# Patient Record
Sex: FEMALE | Race: OTHER
[De-identification: names, ages, dates, MRNs, and addresses within clinical notes are randomized per-mention and may not be internally consistent; named-entity substitution may affect disease eponyms.]

---

## 2022-01-08 ENCOUNTER — HOSPITAL ENCOUNTER (OUTPATIENT)
Dept: HOSPITAL 63 - RAD | Age: 37
End: 2022-01-08
Payer: OTHER GOVERNMENT

## 2022-01-08 DIAGNOSIS — M25.572: Primary | ICD-10-CM

## 2022-01-08 PROCEDURE — 73610 X-RAY EXAM OF ANKLE: CPT

## 2022-01-09 NOTE — RAD
XR EXAM OF ANKLE_LEFT 3V



History: Reason: LEFT ANKLE PAIN, GANGLIAL CYST NEAR LATERAL MALLEOLOUS / Spl. Instructions:  / Histo
ry: 



Technique: 3 views left ankle



Comparison: None.



Findings:

No dislocation. No acute fracture. Symmetric ankle mortise.



Impression: 

1.  No acute osseous abnormality.



Electronically signed by: Erickson Flores DO (1/9/2022 2:52 AM) [facility]

## 2022-01-13 ENCOUNTER — HOSPITAL ENCOUNTER (EMERGENCY)
Dept: HOSPITAL 63 - ER | Age: 37
Discharge: HOME | End: 2022-01-13
Payer: OTHER GOVERNMENT

## 2022-01-13 VITALS — WEIGHT: 176.37 LBS | HEIGHT: 62 IN | BODY MASS INDEX: 32.46 KG/M2

## 2022-01-13 VITALS — SYSTOLIC BLOOD PRESSURE: 120 MMHG | DIASTOLIC BLOOD PRESSURE: 56 MMHG
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
## 2022-01-13 DIAGNOSIS — Z88.2: ICD-10-CM

## 2022-01-13 DIAGNOSIS — L50.9: ICD-10-CM

## 2022-01-13 DIAGNOSIS — T78.40XA: Primary | ICD-10-CM

## 2022-01-13 PROCEDURE — 99283 EMERGENCY DEPT VISIT LOW MDM: CPT

## 2022-01-13 PROCEDURE — 94640 AIRWAY INHALATION TREATMENT: CPT

## 2022-01-13 PROCEDURE — 96372 THER/PROPH/DIAG INJ SC/IM: CPT

## 2022-01-13 NOTE — PHYS DOC
General Adult


HPI:


HPI:


".. I am having another one of my allergic reactions... ".. " I don't know what 

is causing these episodes..."





Patient is a 36 year old female who presents with above hx and complaints of 

allergic reaction.   Patient is a several episodes where she developed hives.  

Currently she has hives and erythemic itchy areas all over her body.  The most 

prevalent on her back.  Patient did take a warm shower to try to relieve the 

itching however this only exacerbated her symptoms.  Patient denies any new 

foods.  Patient denies any new meds.  Patient denies any new soaps or personal 

hygiene products.  Patient denies any recent travel.  Notes no history 

immunosuppression.  Does have a confirmed allergy to sulfa and sulfa 

medications.  Previous skin allergy testing did not identify a possible source 

of her episodic hives.  Patient has been taking Zyrtec with minimal relief.  Pt.

normally follows with Dr. Tapan Mccullough





Review of Systems:


Review of Systems:


Constitutional:  Denies fever or chills 


Eyes:  Denies change in visual acuity 


HENT:  Denies nasal congestion or sore throat 


Respiratory:  Denies cough or shortness of breath 


Cardiovascular:  Denies chest pain or edema 


GI:  Denies abdominal pain, nausea, vomiting, bloody stools or diarrhea 


: Denies dysuria 


Musculoskeletal:  Denies back pain or joint pain 


Integument: Complains of rash/hives


Neurologic:  Denies headache, focal weakness or sensory changes 


Endocrine:  Denies polyuria or polydipsia 


Lymphatic:  Denies swollen glands 


Psychiatric:  Denies depression or anxiety





Family History:


Family History:


Noncontributory to presentation





Current Medications:


Current Meds:


See nursing for home meds





Allergies:


Allergies:


Sulfa allergy





Physical Exam:


PE:





Constitutional: In moderate acute distress, non-toxic appearance. []


HENT: Normocephalic, atraumatic, bilateral external ears normal, oropharynx 

moist, no oral exudates, nose slightly swollen turbinates with clear rhinorrhea


Eyes: PERRLA, EOMI, conjunctiva normal, no discharge. [] 


Neck: Normal range of motion, no tenderness, supple, no stridor. [] 


Cardiovascular:Heart rate regular rhythm, no murmur []


Lungs & Thorax:  Bilateral breath sounds equal at apex with few scattered 

wheezes on auscultation []


Abdomen: Bowel sounds normal, soft, no tenderness, no masses, no pulsatile 

masses.  Obese


Skin: Warm, dry, diffuse erythemic rash/hives


Back: No tenderness, no CVA tenderness. [] 


Extremities: No tenderness, no cyanosis, no clubbing, ROM intact, no edema. [] 


Neurologic: Alert and oriented X 3, normal motor function, normal sensory fun

ction, no focal deficits noted. []


Psychologic: Affect anxious,, judgement normal, mood normal. []





EKG:


EKG:


[]





Radiology/Procedures:


Radiology/Procedures:


[]





Heart Score:


C/O Chest Pain:  N/A


Risk Factors:


Risk Factors:  DM, Current or recent (<one month) smoker, HTN, HLP, family 

history of CAD, obesity.


Risk Scores:


Score 0 - 3:  2.5% MACE over next 6 weeks - Discharge Home


Score 4 - 6:  20.3% MACE over next 6 weeks - Admit for Clinical Observation


Score 7 - 10:  72.7% MACE over next 6 weeks - Early Invasive Strategies





Course & Med Decision Making:


Course & Med Decision Making


Pertinent Labs and Imaging studies reviewed. (See chart for details)





Patient to try and identify etiology or cause of her episodes of hives.  Advised

 this may be a combined response 1 or more items together because of the 

reaction whether alone they do not.  Follow-up primary care.  We will give Depo-

Medrol.40.   Pepcid, 20 twice daily and patient to use.  Patient to use 

Ventolin.  MDI 2 puffs 4 times a day.  Patient may also take Benadryl 25 to 50 

mg 4 times a day when home.





Impression:





1.  Allergic reaction-hives





[]





Dragon Disclaimer:


Dragon Disclaimer:


This electronic medical record was generated, in whole or in part, using a voice

 recognition dictation system.





Departure


Departure:


Referrals:  


GABO GONSALVES DO, MPH (PCP)


Scripts


Famotidine (PEPCID) 20 Mg Tablet


20 MG PO BID for hives for 30 Days, #60 TAB


   Prov: GUSTAVO VALENCIA MD         1/13/22





Dragon Disclaimer


This chart was dictated in whole or in part using Voice Recognition software in 

a busy, high-work load, and often noisy Emergency Department environment.  It 

may contain unintended and wholly unrecognized errors or omissions.











GUSTAVO VALENCIA MD           Jan 13, 2022 20:00

## 2022-03-16 ENCOUNTER — HOSPITAL ENCOUNTER (OUTPATIENT)
Dept: HOSPITAL 61 - SURG | Age: 37
Discharge: HOME | End: 2022-03-16
Attending: STUDENT IN AN ORGANIZED HEALTH CARE EDUCATION/TRAINING PROGRAM
Payer: OTHER GOVERNMENT

## 2022-03-16 VITALS — SYSTOLIC BLOOD PRESSURE: 111 MMHG | DIASTOLIC BLOOD PRESSURE: 56 MMHG

## 2022-03-16 VITALS — HEIGHT: 62 IN | WEIGHT: 167.99 LBS | BODY MASS INDEX: 30.91 KG/M2

## 2022-03-16 DIAGNOSIS — M67.472: Primary | ICD-10-CM

## 2022-03-16 DIAGNOSIS — Z88.2: ICD-10-CM

## 2022-03-16 DIAGNOSIS — Z85.828: ICD-10-CM

## 2022-03-16 DIAGNOSIS — Z79.899: ICD-10-CM

## 2022-03-16 DIAGNOSIS — Z88.8: ICD-10-CM

## 2022-03-16 DIAGNOSIS — Z98.890: ICD-10-CM

## 2022-03-16 PROCEDURE — A6223 GAUZE >16<=48 NO W/SAL W/O B: HCPCS

## 2022-03-16 PROCEDURE — 81025 URINE PREGNANCY TEST: CPT

## 2022-03-16 PROCEDURE — A6253 ABSORPT DRG > 48 SQ IN W/O B: HCPCS

## 2022-03-16 PROCEDURE — A6449 LT COMPRES BAND >=3" <5"/YD: HCPCS

## 2022-03-16 PROCEDURE — A4209 5+ CC STERILE SYRINGE&NEEDLE: HCPCS

## 2022-03-16 PROCEDURE — A6402 STERILE GAUZE <= 16 SQ IN: HCPCS

## 2022-03-16 PROCEDURE — 28039 EXC FOOT/TOE TUM SC 1.5 CM/>: CPT

## 2022-03-16 PROCEDURE — 88304 TISSUE EXAM BY PATHOLOGIST: CPT

## 2022-03-16 PROCEDURE — A4930 STERILE, GLOVES PER PAIR: HCPCS

## 2022-03-16 NOTE — PDOC4
OPERATIVE NOTE


Date:


Date:  Mar 16, 2022





Pre-Op Diagnosis:


Left foot soft tissue mass





Post-Op Diagnosis:


Same as above





Procedure Performed:


Left soft tissue mass excision and pathology study





Surgeon:


Rashel Stauffer DPM





Anesthesia Type:


General





Blood Loss:


5 cc





Specimans Obtained:


Soft tissue mass from the left sinus tarsi sent for pathology





Findings:


Soft tissue mass that is well encapsulated with clear viscous fluid filled in 

the center, Measures approximately 3 x 2 cm.  The soft tissue mass extended from

the sinus tarsi, not from the peroneal tendon sheath.  There is no significant 

subtalar joint synovitis, or OCD lesion.





Complications:


None





Operative Note:


Under mild sedation, patient was brought into the operating room and placed on 

operating table in a supine position.  A formal timeout confirming patient's 

identity, procedure, procedure site was carried out.  Following IV prophylactic 

antibiotics, general anesthesia, a well-padded High ankle tourniquet was placed 

on the left lower extremity.  The left lower extremity was then scrubbed, 

prepped and draped using aseptic techniques.  The left lower extremity was 

exsanguinated and then the tourniquet was inflated to 225 mill mercury.





The attention was directed to the dorsal lateral sinus tarsi where the soft 

tissue was palpated.  And and intraoperative sterile ultrasound was used to 

confirm the location of the soft tissue mass which was near the sinus tarsi, not

off the peroneal tendons.  The intraoperative foot x-ray from multiple views 

were insignificant for periarticular osteophyte or osseous prominence at the 

sinus tarsi region.Then a linear incision was made over the soft tissue mass 

along the sinus tarsi.  The incision was carried deep with a combination of 

sharp and blunt dissection with care to protect and retract all the 

neurovascular bundles.  The soft tissue mass was encountered and noted well enc

apsulated just dorsal to the peroneal tendons.  The surrounding soft tissue was 

carefully elevated off the soft tissue mass in the soft tissue mass was noted 

extended from the sinus tarsi capsule.  The lateral sinus tarsi capsule was 

excised along with the soft tissue mass in toto.  The soft tissue mass was 

measured 2 x 3 cm.  There was clear viscous fluid noted within the soft tissue 

mass as well.  At this time, the sinus tarsi was insignificant for synovitis, 

OCD lesion.  The peroneal tendons were intact.  The EDB/EHB muscle bellies were 

intact.  There was no extension to the CC joint.  The surgical site was 

irrigated with copious saline solution.  Bovie was used to cauterize the lateral

sinus tarsi capsule, extensor tendons and belly.  3-0 Vicryl was used to close 

the lateral subtalar joint capsule.  4-0 Monocryl and 4 nylon were used for 

layered skin closure under minimal tension.  5 cc of 0.25% Marcaine plain was 

infiltrated to the surgical site for postoperative anesthesia.  The surgical 

site was dressed with Xeroform, gauze, Kerlix and Ace compression.  Tourniquet 

was deflated and adequate digital perfusion was noted.





Patient tolerated the procedure and anesthesia well and then transferred to PACU

for continuous recovery.











RASHEL STAUFFER DPM               Mar 16, 2022 11:41

## 2022-03-17 NOTE — PATHOLOGY
[facility] Accession Number: [id number]

.                                                                01

Material submitted:                                        .

foot - LEFT FOOT SINUS TARSI SOFT TISSUE MASS. Modifiers: left

.                                                                02

**********************************************************************

Diagnosis:

Segment of dense fibroconnective, fibroadipose, and synovial tissue, left

foot sinus tarsi soft tissue mass:

- Ganglion cyst.

(JPM:jyothi; 03/17/2022)

[facility]  03/17/2022  1410 Local

**********************************************************************

.                                                                02

Electronically signed:                                     .

Kevin Jones MD, Pathologist

NPI- [id number]

.                                                                01

Gross description:                                         .

The specimen is received in formalin, labeled "Maria E Logan, left foot

sinus tarsi soft tissue mass".  Received is an unoriented irregularly

shaped, pale yellow to white-tan segment of soft tissue measuring 2.6 x

1.9 x 0.8 cm in greatest dimension.  The specimen is serially sectioned

and entirely submitted in cassette [id number].

([facility]; 3/16/2022)

[facility]  03/17/2022  1409 Local

.                                                                02

Pathologist provided ICD-10:

M67.472

.                                                                02

CPT                                                        .

[id number]

Specimen Comment: A courtesy copy of this report has been sent to [phone], [phone]

Specimen Comment: [id number]

Specimen Comment: Report sent to  / DR GLOVER

Specimen Comment: A duplicate report has been generated due to demographic updates.

***Performed at:  01

   [facility]

   [address]  [id number]

   MD Jam Goff MD Phone:  [phone]

***Performed at:  02

   [facility]

   [address] [place], [place]  [id number]

   MD Kevin Jones MD Phone:  [phone]